# Patient Record
Sex: MALE | Employment: FULL TIME | URBAN - METROPOLITAN AREA
[De-identification: names, ages, dates, MRNs, and addresses within clinical notes are randomized per-mention and may not be internally consistent; named-entity substitution may affect disease eponyms.]

---

## 2019-12-24 ENCOUNTER — HOSPITAL ENCOUNTER (EMERGENCY)
Facility: CLINIC | Age: 44
Discharge: HOME | End: 2019-12-24
Attending: INTERNAL MEDICINE | Admitting: INTERNAL MEDICINE

## 2019-12-24 VITALS
RESPIRATION RATE: 18 BRPM | HEART RATE: 82 BPM | SYSTOLIC BLOOD PRESSURE: 142 MMHG | OXYGEN SATURATION: 99 % | DIASTOLIC BLOOD PRESSURE: 100 MMHG | TEMPERATURE: 97.1 F

## 2019-12-24 DIAGNOSIS — S61.214A LACERATION OF RIGHT RING FINGER WITHOUT FOREIGN BODY WITHOUT DAMAGE TO NAIL, INITIAL ENCOUNTER: ICD-10-CM

## 2019-12-24 PROCEDURE — 12001 RPR S/N/AX/GEN/TRNK 2.5CM/<: CPT

## 2019-12-24 PROCEDURE — 99283 EMERGENCY DEPT VISIT LOW MDM: CPT

## 2019-12-24 ASSESSMENT — ENCOUNTER SYMPTOMS: WOUND: 1

## 2019-12-24 NOTE — ED NOTES
Patient tolerated numbing and stitching of laceration. No active bleeding. . MD in to see patient and discuss diagnosis, test results, and discharge plan. Patient meets discharge criteria. Discussed AVS with patient. Questions answered. Patient verbalized understanding. Patient reports being ready to go home. Patient discharged home by car with all necessary information.

## 2019-12-24 NOTE — DISCHARGE INSTRUCTIONS

## 2019-12-24 NOTE — ED PROVIDER NOTES
History     Chief Complaint:  Laceration     The history is provided by the patient.      Dewey Colunga is a right handed 44 year old male who presents with a laceration. Prior to arrival, the patient was at work, at [employer], when he got his fourth finger on his right hand slammed in a door. Work was informed about his injury and ED visit.     Tdap: 2011    Allergies:  No Known Drug Allergies     Medications:    The patient is not currently taking any prescribed medications.    Past Medical History:    The patient denies any significant past medical history.    Past Surgical History:    The patient does not have any pertinent past surgical history.    Family History:    No past pertinent family history.    Social History:  Works at [employer].    Review of Systems   Skin: Positive for wound (laceration).       Physical Exam     Patient Vitals for the past 24 hrs:   BP Temp Temp src Pulse Resp SpO2   12/24/19 1011 (!) 142/100 97.1  F (36.2  C) Oral 82 18 99 %     Physical Exam  Cardiovascular:      Pulses: Normal pulses.   Musculoskeletal:      Comments: Laceration dorsum of right ring finger, overlying proximal phalanx and PIP area.   Normal CMS, including extension against resistance.    Skin:     Findings: Laceration present.   Neurological:      Mental Status: He is alert.      Sensory: No sensory deficit.   Psychiatric:         Behavior: Behavior is cooperative.         Emergency Department Course   Procedures:    Laceration Repair        LACERATION:  A simple and superficial clean 1.6 cm laceration.      LOCATION:  Right fourth finger      FUNCTION:  Distally sensation, circulation, motor and tendon function are intact.      ANESTHESIA:  Local using 1% Lidocaine total of 1 mLs      PREPARATION:  Irrigation and Scrubbing with Normal Saline and Shur Clens      DEBRIDEMENT:  no debridement      CLOSURE:  Wound was closed with One Layer.  Skin closed with 5 x 5.0 Ethylon using interrupted  sutures.    Emergency Department Course:  Nursing notes and vitals reviewed. 1015 I performed an exam of the patient as documented above.     1025 I rechecked the patient and discussed the results of his workup thus far. I closed his laceration, see procedure note above.     Findings and plan explained to the Patient. Patient discharged home with instructions regarding supportive care, medications, and reasons to return. The importance of close follow-up was reviewed.    I personally answered all related questions prior to discharge.     Impression & Plan    Medical Decision Making:    Findings and exam were consistent with uncomplicated laceration of the fourth digit on his right hand. The wound was carefully evaluated and explored. Was repaired as noted above. There is no evidence at this time of bony injury, foreign body, deep space infection, or neurovascular injury. Given location, will splint for 3 days to avoid suture disruption. The patient isto follow-up for suture removal in 10 days. Indications for immediate return to ER/UR were reviewed and included but are not limited to, redness, fevers, drainage, increasing pain, high fevers, or other concerns. Work restrictions given.     Diagnosis:    ICD-10-CM    1. Laceration of right ring finger without foreign body without damage to nail, initial encounter S61.214A        Disposition:  discharged to home    Scribe Disclosure:  I, Mame Sweeney, am serving as a scribe on 12/24/2019 at 10:18 AM to personally document services performed by Rissa Barber MD based on my observations and the provider's statements to me.     Mame Sweeney  12/24/2019   [facility] EMERGENCY DEPARTMENT       Rissa Barber MD  12/24/19 1045

## 2019-12-24 NOTE — LETTER
[facility] EMERGENCY DEPARTMENT  [address]  [facility] [address]  [phone]      2019    Dewey Colunga  No address on file.  There are no phone numbers on file.    : 1975      To Whom it may concern:    Dewey Colunga was seen in our Emergency Department today, 2019 for an injury that was reported to be work related.        After returning to work the following restrictions apply for 10 days:   keep injury covered and dry    The employee must keep the injured site clean and dry. Finger splint for 3 days.     Sincerely,    Rissa Barber MD

## 2019-12-24 NOTE — ED TRIAGE NOTES
Patient arrived at around 10:10 complaining of right fourth finger. Bleeding controlled. Able to move fingers without difficulty. Finger was shut in door causing laceration. ABCs intact. Alert and oriented X4.

## 2019-12-24 NOTE — ED AVS SNAPSHOT
[facility] Emergency Department  [address]  [facility] [address]  Phone:  [phone]  Fax:  [phone]                                    Dewey Colunga   MRN: [id number]    Department:  [facility] Emergency Department   Date of Visit:  12/24/2019           After Visit Summary Signature Page    I have received my discharge instructions, and my questions have been answered. I have discussed any challenges I see with this plan with the nurse or doctor.    ..........................................................................................................................................  Patient/Patient Representative Signature      ..........................................................................................................................................  Patient Representative Print Name and Relationship to Patient    ..................................................               ................................................  Date                                   Time    ..........................................................................................................................................  Reviewed by Signature/Title    ...................................................              ..............................................  Date                                               Time          22EPIC Rev 08/18

## 2020-01-02 ENCOUNTER — ALLIED HEALTH/NURSE VISIT (OUTPATIENT)
Dept: NURSING | Facility: CLINIC | Age: 45
End: 2020-01-02

## 2020-01-02 DIAGNOSIS — Z48.02 VISIT FOR SUTURE REMOVAL: Primary | ICD-10-CM

## 2020-01-02 PROCEDURE — 99207 ZZC NO CHARGE NURSE ONLY: CPT

## 2020-01-02 NOTE — NURSING NOTE
Patient here for suture removal.    5 sutures visualized on right ring finger.     Sutures in place for 9 days.    Sutures do not appear ready for removal.      Patient plans to return for suture removal on 1/6/20.  Appt scheduled with RN triage on 1/6/20.  Appt date, time and location confirmed with patient, written down on appt card and handed to patient.    Patient verbalized understanding and in agreement with plan.    KATERYNA Zacarias, RN

## 2020-01-06 ENCOUNTER — ALLIED HEALTH/NURSE VISIT (OUTPATIENT)
Dept: NURSING | Facility: CLINIC | Age: 45
End: 2020-01-06

## 2020-01-06 DIAGNOSIS — Z48.02 VISIT FOR SUTURE REMOVAL: Primary | ICD-10-CM

## 2020-01-06 PROCEDURE — 99207 ZZC NO CHARGE NURSE ONLY: CPT

## 2020-01-06 NOTE — NURSING NOTE
"Dewey Colunga presents to the clinic for removal of sutures. The patient has had sutures in place for 13 days. There has been no patient reported signs or symptoms of infection or drainage. 5  sutures are seen and located on the right fourth finger.  All sutures were easily removed today. Routine wound care discussed by writer.  1/8\" steri strips placed over laceration.  Patient will follow up as needed.  BETO ZacariasN, RN    "